# Patient Record
Sex: MALE | Race: WHITE | NOT HISPANIC OR LATINO | URBAN - METROPOLITAN AREA
[De-identification: names, ages, dates, MRNs, and addresses within clinical notes are randomized per-mention and may not be internally consistent; named-entity substitution may affect disease eponyms.]

---
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
## 2023-01-11 DIAGNOSIS — J06.9 UPPER RESPIRATORY TRACT INFECTION, UNSPECIFIED TYPE: Primary | ICD-10-CM

## 2023-01-11 RX ORDER — AMOXICILLIN 875 MG
875 TABLET ORAL 2 TIMES DAILY
Qty: 20 TABLET | Refills: 0 | Status: SHIPPED | OUTPATIENT
Start: 2023-01-11 | End: 2023-01-21

## 2024-03-01 ENCOUNTER — OFFICE VISIT (OUTPATIENT)
Dept: ORTHOPEDICS | Facility: CLINIC | Age: 19
End: 2024-03-01
Payer: COMMERCIAL

## 2024-03-01 DIAGNOSIS — M25.511 ACUTE PAIN OF RIGHT SHOULDER: Primary | ICD-10-CM

## 2024-03-01 NOTE — LETTER
3/1/2024      RE: Casa Jacobo  [address]   [place] [address]     Dear Colleague,    Thank you for referring your patient, Casa Jacobo, to the  [facility]. Please see a copy of my visit note below.    Chief Complaint: Right shoulder pain    Sport:  Hockey    History of Present Illness:  Casa is a 18-year-old  with a right grade 1-2 AC joint injury.  We have been managing discomfort with a local anesthetic injection.  This has been effective.  No issues to date.  He is requesting an injection for tonight's game.    Physical Examination:  18-year-old male alert oriented no apparent distress.  Full range of motion of the shoulder.  5/5 rotator cuff strength.  Slight swelling at the AC joint.  Minimal deformity.  He is tender.    Impression:  18-year-old  with a right grade 1-2 AC joint injury.  Requesting local anesthetic injection for pain control.  I had an opportunity to discuss the injections with Casa.  We discussed the risks.  He understands and would like to proceed.    PROCEDURE NOTE: After explanation of risks and benefits, verbal consent was obtained.  The right AC joint was prepped in a sterile fashion.  2 cc of lidocaine and 2 cc of Marcaine injected into the right AC joint and along the distal clavicle periosteum.  No complications.    Plan:  Casa is cleared to play.  He will continue to work with his  on rehabilitation.  We may need to repeat the injection for tomorrow night's game.      Again, thank you for allowing me to participate in the care of your patient.      Sincerely,    Stephen Lambert MD

## 2024-03-02 ENCOUNTER — OFFICE VISIT (OUTPATIENT)
Dept: ORTHOPEDICS | Facility: CLINIC | Age: 19
End: 2024-03-02
Payer: COMMERCIAL

## 2024-03-02 DIAGNOSIS — M25.511 ACUTE PAIN OF RIGHT SHOULDER: Primary | ICD-10-CM

## 2024-03-02 NOTE — LETTER
3/2/2024      RE: Casa Jacobo  [address]   [place] [address]     Dear Colleague,    Thank you for referring your patient, Casa Jacobo, to the [facility]. Please see a copy of my visit note below.    Chief Complaint: Right shoulder pain    Sport:  Hockey    History of Present Illness:  Casa is a 18-year-old  with a right grade 1-2 AC joint injury.  We have been performing local anesthetic injections to help with discomfort.  This has been going well.  He has had no issues to date.  He is requesting an injection.    Physical Examination:  18-year-old male alert oriented no apparent distress.  Full range of motion of the shoulder.  5/5 strength.  Tender at the AC joint.  Slight deformity but no change.    Impression:  18-year-old  with a right grade 1-2 AC joint injury.  Requesting local anesthetic injection.  He has tolerated this well in the past.  We reviewed the risks and he understands and agrees to proceed    PROCEDURE NOTE: After explanation of risks and benefits, verbal consent was obtained.  The right AC joint was sterilely prepped.  2 cc of lidocaine and 2 cc of Marcaine injected into the right AC joint and distal clavicle periosteum.  No complications.    Plan:  He is cleared to play.  Will continue rehabilitation with his .    Again, thank you for allowing me to participate in the care of your patient.      Sincerely,    Stephen Lambert MD

## 2024-03-08 ENCOUNTER — OFFICE VISIT (OUTPATIENT)
Dept: ORTHOPEDICS | Facility: CLINIC | Age: 19
End: 2024-03-08
Payer: COMMERCIAL

## 2024-03-08 DIAGNOSIS — M25.511 ACUTE PAIN OF RIGHT SHOULDER: Primary | ICD-10-CM

## 2024-03-08 NOTE — LETTER
3/8/2024      RE: Casa Jacobo  [address]   [place] [facility] [address]     Dear Colleague,    Thank you for referring your patient, Casa Jacobo, to the [facility]. Please see a copy of my visit note below.    Chief Complaint: Right shoulder pain    Sport:  Hockey    History of Present Illness:  Casa is a 18-year-old  who sustained a right AC joint injury a few weeks ago.  He improved.  We did need to perform local anesthetic injections into the AC joint which was quite helpful.  Tonight in the game against [employer] he had a reinjury.  He comes in during the game requesting an injection.    Physical Examination:  18-year-old male alert oriented no apparent distress.  Full range of motion of the shoulder.  Rotator cuff strength is 5/5.  Tender at the AC joint.  No significant increase in deformity.    Impression:  18-year-old  who is reaggravated his right grade 1-2 AC joint injury.  He is requesting local anesthetic injection.  We discussed this.  We discussed the risks.  He understands would like to proceed    PROCEDURE NOTE: After explanation of benefits and risks, verbal consent was obtained.  The right shoulder was sterilely prepped.  2 cc 1% lidocaine and 2 cc of Marcaine injected into the right AC joint without difficulty.  The patient did note pain relief.    Plan:  He is cleared to play tonight.  He may need an injection for tomorrow's game.  I do think we should obtain new radiographs next week.      Again, thank you for allowing me to participate in the care of your patient.      Sincerely,    Stephen Lambert MD

## 2024-03-09 ENCOUNTER — OFFICE VISIT (OUTPATIENT)
Dept: ORTHOPEDICS | Facility: CLINIC | Age: 19
End: 2024-03-09
Payer: COMMERCIAL

## 2024-03-09 DIAGNOSIS — S43.51XA ACROMIOCLAVICULAR SPRAIN, RIGHT, INITIAL ENCOUNTER: Primary | ICD-10-CM

## 2024-03-09 NOTE — LETTER
3/9/2024      RE: Casa Jacobo  [address]   [place] [place] [facility] [address]     Dear Colleague,    Thank you for referring your patient, Casa Jacobo, to the [facility]. Please see a copy of my visit note below.    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  He is still having pain over the AC joint.  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  He has undergone injection of the AC joint with lidocaine and bupivacaine during last week's game which provided symptomatic relief.  We discussed the risk and benefits of repeating an intra-articular injection today.  He would like to move forward with pregame injection of his right AC joint.  He received modest amount of pain relief from this initial injection.  It was repeated between the second and third.  Of his collegiate hockey game.  He received excellent pain control from the second injection.    Objective:   PE:  RUE: No open wounds or lacerations.  Mild ecchymosis over the AC joint.  Pain to palpation over the AC joint.  No limitations in range of motion of the shoulder.  Sensation intact in axillary, radial, median, and ulnar nerve distribution of the hand.  2+ radial pulse.    Procedure:   Written informed consent for Right/ AC joint intra-articular injection was obtained from the patient after discussing the risk and benefits of the procedure.  Risk and benefits including but not limited to bleeding, infection, failure to cure pain and allergic reaction were discussed.  The superior portal was identified by palpation of bony landmarks.  The skin was cleaned with chlorhexidine solution.  Under sterile technique the superior portal was utilized to inject the entirety of the lidocaine and bupivacaine solution.  Soft dressings were applied.  Patient tolerated the procedure without difficulty.  I counseled the patient on signs and symptoms of  allergic reaction and infection.    This process was then repeated again between the second and third.  Obvious collegiate hockey game using the same technique and dosage.      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  We discussed the risks, benefits, and noninvasive alternatives to a right intra-articular AC joint injection.  I the opportunity answer his questions.  Injection was performed as highlighted above before the game.  This was again repeated between the second and third period.  He tolerated both procedures well.  He is cleared for full participation.    Obed Ortiz MD    [facility]   Department of Orthopedic Surgery      Disclaimer: This note consists of symbols derived from keyboarding, dictation and/or voice recognition software. As a result, there may be errors in the script that have gone undetected. Please consider this when interpreting information found in this chart.       Again, thank you for allowing me to participate in the care of your patient.      Sincerely,    Obed Ortiz MD

## 2024-03-10 RX ORDER — LIDOCAINE HYDROCHLORIDE 10 MG/ML
2 INJECTION, SOLUTION EPIDURAL; INFILTRATION; INTRACAUDAL; PERINEURAL
Status: SHIPPED | OUTPATIENT
Start: 2024-03-10

## 2024-03-10 RX ORDER — BUPIVACAINE HYDROCHLORIDE 5 MG/ML
2 INJECTION, SOLUTION EPIDURAL; INTRACAUDAL
Status: SHIPPED | OUTPATIENT
Start: 2024-03-10

## 2024-03-10 RX ADMIN — LIDOCAINE HYDROCHLORIDE 2 ML: 10 INJECTION, SOLUTION EPIDURAL; INFILTRATION; INTRACAUDAL; PERINEURAL at 17:28

## 2024-03-10 RX ADMIN — BUPIVACAINE HYDROCHLORIDE 2 ML: 5 INJECTION, SOLUTION EPIDURAL; INTRACAUDAL at 17:28

## 2024-03-10 NOTE — PROGRESS NOTES
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  He is still having pain over the AC joint.  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  He has undergone injection of the AC joint with lidocaine and bupivacaine during last week's game which provided symptomatic relief.  We discussed the risk and benefits of repeating an intra-articular injection today.  He would like to move forward with pregame injection of his right AC joint.  He received modest amount of pain relief from this initial injection.  It was repeated between the second and third.  Of his collegiate hockey game.  He received excellent pain control from the second injection.    Objective:   PE:  RUE: No open wounds or lacerations.  Mild ecchymosis over the AC joint.  Pain to palpation over the AC joint.  No limitations in range of motion of the shoulder.  Sensation intact in axillary, radial, median, and ulnar nerve distribution of the hand.  2+ radial pulse.    Procedure:   Written informed consent for Right/ AC joint intra-articular injection was obtained from the patient after discussing the risk and benefits of the procedure.  Risk and benefits including but not limited to bleeding, infection, failure to cure pain and allergic reaction were discussed.  The superior portal was identified by palpation of bony landmarks.  The skin was cleaned with chlorhexidine solution.  Under sterile technique the superior portal was utilized to inject the entirety of the lidocaine and bupivacaine solution.  Soft dressings were applied.  Patient tolerated the procedure without difficulty.  I counseled the patient on signs and symptoms of allergic reaction and infection.    This process was then repeated again between the second and third.  Obvious collegiate hockey game using the same technique and dosage.      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  We discussed the risks, benefits, and noninvasive alternatives to a right intra-articular AC joint injection.  I the opportunity answer his questions.  Injection was performed as highlighted above before the game.  This was again repeated between the second and third period.  He tolerated both procedures well.  He is cleared for full participation.    Obed Ortiz MD    [facility]   Department of Orthopedic Surgery      Disclaimer: This note consists of symbols derived from keyboarding, dictation and/or voice recognition software. As a result, there may be errors in the script that have gone undetected. Please consider this when interpreting information found in this chart.

## 2024-03-11 ENCOUNTER — TELEPHONE (OUTPATIENT)
Dept: ORTHOPEDICS | Facility: CLINIC | Age: 19
End: 2024-03-11

## 2024-03-11 DIAGNOSIS — M25.519 PAIN IN JOINT, SHOULDER REGION: Primary | ICD-10-CM

## 2024-03-12 ENCOUNTER — ANCILLARY PROCEDURE (OUTPATIENT)
Dept: GENERAL RADIOLOGY | Facility: CLINIC | Age: 19
End: 2024-03-12
Attending: FAMILY MEDICINE
Payer: COMMERCIAL

## 2024-03-12 DIAGNOSIS — M25.519 PAIN IN JOINT, SHOULDER REGION: ICD-10-CM

## 2024-03-12 PROCEDURE — 73030 X-RAY EXAM OF SHOULDER: CPT | Mod: RT | Performed by: RADIOLOGY

## 2024-03-16 ENCOUNTER — OFFICE VISIT (OUTPATIENT)
Dept: ORTHOPEDICS | Facility: CLINIC | Age: 19
End: 2024-03-16
Payer: COMMERCIAL

## 2024-03-16 DIAGNOSIS — S43.51XA ACROMIOCLAVICULAR SPRAIN, RIGHT, INITIAL ENCOUNTER: Primary | ICD-10-CM

## 2024-03-16 RX ADMIN — LIDOCAINE HYDROCHLORIDE 1 ML: 10 INJECTION, SOLUTION EPIDURAL; INFILTRATION; INTRACAUDAL; PERINEURAL at 19:30

## 2024-03-16 RX ADMIN — BUPIVACAINE HYDROCHLORIDE 2 ML: 5 INJECTION, SOLUTION EPIDURAL; INTRACAUDAL at 19:30

## 2024-03-16 NOTE — LETTER
"  3/16/2024      RE: Casa Jacobo  [address]   [place] [address]     Dear Colleague,    Thank you for referring your patient, Casa Jacobo, to the [facility]. Please see a copy of my visit note below.    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  He is here for an injection.    Medium Joint Injection/Arthrocentesis: R acromioclavicular    Date/Time: 3/19/2024 3:01 PM    Performed by: Carlitos Crowley DO  Authorized by: Carlitos Crowley DO    Indications:  Pain  Needle Size:  22 G  Guidance: ultrasound    Approach:  Superior  Location:  Shoulder  Site:  R acromioclavicular  Medications:  1 mL lidocaine (PF) 1 %; 2 mL BUPivacaine (PF) 0.5 %  Outcome:  Tolerated well, no immediate complications  Consent Given by:  Patient     PROCEDURE    Acromioclavicular Injection - Ultrasound Guided    The patient was informed of the risks and the benefits of the procedure and a written consent was signed.    The patient's right acromioclavicular joint was prepped with chlorhexidine in sterile fashion.   1mg of 1% lidocaine without epinephrine and 2 mL of 0.5% bupivacaine were drawn up into a 3 mL syringe.  Injection was performed using sterile technique.  Under ultrasound guidance a 1.5\" 22-gauge needle was used to enter the acromioclavicular joint.  Needle placement was visualized and documented with ultrasound.  Needle placed in short axis to the probe.  Images were permanently stored for the patient's record.    There were no complications. The patient tolerated the procedure well. There was negligible bleeding.             Dontrell Crowley DO CAQSM    Again, thank you for allowing me to participate in the care of your patient.      Sincerely,    Carlitos Crowley DO  "

## 2024-03-19 RX ORDER — BUPIVACAINE HYDROCHLORIDE 5 MG/ML
2 INJECTION, SOLUTION EPIDURAL; INTRACAUDAL
Status: SHIPPED | OUTPATIENT
Start: 2024-03-16 | End: 2024-03-16

## 2024-03-19 RX ORDER — LIDOCAINE HYDROCHLORIDE 10 MG/ML
1 INJECTION, SOLUTION EPIDURAL; INFILTRATION; INTRACAUDAL; PERINEURAL
Status: SHIPPED | OUTPATIENT
Start: 2024-03-16 | End: 2024-03-16

## 2024-03-19 NOTE — PROGRESS NOTES
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  He is here for an injection.    Medium Joint Injection/Arthrocentesis: R acromioclavicular    Date/Time: 3/16/2024 7:30 PM    Performed by: Carlitos Crowley DO  Authorized by: Carlitos Crowley DO    Indications:  Pain  Needle Size:  22 G  Guidance: ultrasound    Approach:  Superior  Location:  Shoulder  Site:  R acromioclavicular  Medications:  1 mL lidocaine (PF) 1 %; 2 mL BUPivacaine (PF) 0.5 %  Outcome:  Tolerated well, no immediate complications  Consent Given by:  Patient     PROCEDURE    Acromioclavicular Injection - Ultrasound Guided    The patient was informed of the risks and the benefits of the procedure and a written consent was signed.    The patient's right acromioclavicular joint was prepped with chlorhexidine in sterile fashion.   1mg of 1% lidocaine without epinephrine and 2 mL of 0.5% bupivacaine were drawn up into a 3 mL syringe.  Injection was performed using sterile technique.  Under ultrasound guidance a 1.5\" 22-gauge needle was used to enter the acromioclavicular joint.  Needle placement was visualized and documented with ultrasound.  Needle placed in short axis to the probe.  Images were permanently stored for the patient's record.    There were no complications. The patient tolerated the procedure well. There was negligible bleeding.             Dontrell Crowley DO CAQSM  "

## 2024-03-29 NOTE — PROGRESS NOTES
Chief Complaint: Right shoulder pain    Sport:  Hockey    History of Present Illness:  Casa is a 18-year-old  with a right grade 1-2 AC joint injury.  We have been performing local anesthetic injections to help with discomfort.  This has been going well.  He has had no issues to date.  He is requesting an injection.    Physical Examination:  18-year-old male alert oriented no apparent distress.  Full range of motion of the shoulder.  5/5 strength.  Tender at the AC joint.  Slight deformity but no change.    Impression:  18-year-old  with a right grade 1-2 AC joint injury.  Requesting local anesthetic injection.  He has tolerated this well in the past.  We reviewed the risks and he understands and agrees to proceed    PROCEDURE NOTE: After explanation of risks and benefits, verbal consent was obtained.  The right AC joint was sterilely prepped.  2 cc of lidocaine and 2 cc of Marcaine injected into the right AC joint and distal clavicle periosteum.  No complications.    Plan:  He is cleared to play.  Will continue rehabilitation with his .

## 2024-03-29 NOTE — PROGRESS NOTES
Chief Complaint: Right shoulder pain    Sport:  Hockey    History of Present Illness:  Casa is a 18-year-old  with a right grade 1-2 AC joint injury.  We have been managing discomfort with a local anesthetic injection.  This has been effective.  No issues to date.  He is requesting an injection for tonight's game.    Physical Examination:  18-year-old male alert oriented no apparent distress.  Full range of motion of the shoulder.  5/5 rotator cuff strength.  Slight swelling at the AC joint.  Minimal deformity.  He is tender.    Impression:  18-year-old  with a right grade 1-2 AC joint injury.  Requesting local anesthetic injection for pain control.  I had an opportunity to discuss the injections with Casa.  We discussed the risks.  He understands and would like to proceed.    PROCEDURE NOTE: After explanation of risks and benefits, verbal consent was obtained.  The right AC joint was prepped in a sterile fashion.  2 cc of lidocaine and 2 cc of Marcaine injected into the right AC joint and along the distal clavicle periosteum.  No complications.    Plan:  Casa is cleared to play.  He will continue to work with his  on rehabilitation.  We may need to repeat the injection for tomorrow night's game.

## 2024-03-29 NOTE — PROGRESS NOTES
Chief Complaint: Right shoulder pain    Sport:  Hockey    History of Present Illness:  Casa is a 18-year-old  who sustained a right AC joint injury a few weeks ago.  He improved.  We did need to perform local anesthetic injections into the AC joint which was quite helpful.  Tonight in the game against [employer] he had a reinjury.  He comes in during the game requesting an injection.    Physical Examination:  18-year-old male alert oriented no apparent distress.  Full range of motion of the shoulder.  Rotator cuff strength is 5/5.  Tender at the AC joint.  No significant increase in deformity.    Impression:  18-year-old  who is reaggravated his right grade 1-2 AC joint injury.  He is requesting local anesthetic injection.  We discussed this.  We discussed the risks.  He understands would like to proceed    PROCEDURE NOTE: After explanation of benefits and risks, verbal consent was obtained.  The right shoulder was sterilely prepped.  2 cc 1% lidocaine and 2 cc of Marcaine injected into the right AC joint without difficulty.  The patient did note pain relief.    Plan:  He is cleared to play tonight.  He may need an injection for tomorrow's game.  I do think we should obtain new radiographs next week.